# Patient Record
Sex: FEMALE | Race: WHITE | NOT HISPANIC OR LATINO | Employment: STUDENT | URBAN - METROPOLITAN AREA
[De-identification: names, ages, dates, MRNs, and addresses within clinical notes are randomized per-mention and may not be internally consistent; named-entity substitution may affect disease eponyms.]

---
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
## 2023-12-13 NOTE — LETTER
January 19, 2022     Patient: Aj Hobbs   YOB: 2005   Date of Visit: 1/19/2022       To Whom it May Concern:    Aj Hobbs is under my professional care  She was seen in my office on 1/19/2022  She may return to school on 1/20/2022  If you have any questions or concerns, please don't hesitate to call           Sincerely,          Brinda Garrett MD        CC: Aj Hobbs Luis Sanchez is calling to request a refill on the following medication(s):  Requested Prescriptions     Pending Prescriptions Disp Refills    atorvastatin (LIPITOR) 40 MG tablet [Pharmacy Med Name: ATORVASTATIN TAB 40MG] 90 tablet 3     Sig: TAKE 1 TABLET NIGHTLY       Last Visit Date (If Applicable):  7/10/6015    Next Visit Date:    Visit date not found

## 2023-12-27 ENCOUNTER — TELEPHONE (OUTPATIENT)
Dept: PSYCHIATRY | Facility: CLINIC | Age: 18
End: 2023-12-27

## 2023-12-27 NOTE — TELEPHONE ENCOUNTER
New Patient Talk Therapy----severe depression---Flexible on days/times for appointments---Knows about referral---Wants to go thru [employer] program thru work---knows to bring in info for billing that before appointment or cost could occur---gm

## 2024-02-21 PROBLEM — J06.9 ACUTE URI: Status: RESOLVED | Noted: 2019-05-19 | Resolved: 2024-02-21

## 2024-09-25 ENCOUNTER — TELEPHONE (OUTPATIENT)
Age: 19
End: 2024-09-25

## 2024-11-11 ENCOUNTER — TELEPHONE (OUTPATIENT)
Age: 19
End: 2024-11-11

## 2024-11-11 NOTE — TELEPHONE ENCOUNTER
Contacted patient off of [facility]  wait list to verify needs of services in attempts to offer appt. spoke with patient whom stated she is back at college and is no longer in need of services.     Attempt #1  Pt removed from wait list.